# Patient Record
Sex: FEMALE | Race: WHITE | HISPANIC OR LATINO | URBAN - METROPOLITAN AREA
[De-identification: names, ages, dates, MRNs, and addresses within clinical notes are randomized per-mention and may not be internally consistent; named-entity substitution may affect disease eponyms.]

---

## 2018-06-21 ENCOUNTER — OFFICE VISIT (OUTPATIENT)
Dept: URBAN - METROPOLITAN AREA CLINIC 11 | Facility: CLINIC | Age: 38
End: 2018-06-21
Payer: COMMERCIAL

## 2018-06-21 DIAGNOSIS — H02.422 MYOGENIC PTOSIS OF LEFT EYELID: Primary | Chronic | ICD-10-CM

## 2018-06-21 PROCEDURE — 92012 INTRM OPH EXAM EST PATIENT: CPT | Performed by: OPHTHALMOLOGY

## 2018-06-21 ASSESSMENT — INTRAOCULAR PRESSURE
OD: 11
OS: 8

## 2018-06-21 NOTE — IMPRESSION/PLAN
Impression: Myogenic ptosis of left eyelid: H02.422. OS. Condition: established, stable. Symptoms: will continue to monitor. Vision: vision not affected. Plan: Discussed diagnosis in detail with patient. Discussed treatment options with patient. Eyelid surgery not indicated, she has decreased vision OS and ptosis is secondary to chronic ocular inflammation. Need to see Avery Rodriguez for cornea evaluation as well as a full dilated exam to check the retina. Will continue to observe condition and or symptoms.

## 2020-01-16 ENCOUNTER — OFFICE VISIT (OUTPATIENT)
Dept: URBAN - METROPOLITAN AREA CLINIC 11 | Facility: CLINIC | Age: 40
End: 2020-01-16
Payer: COMMERCIAL

## 2020-01-16 DIAGNOSIS — H17.89 OTHER CORNEAL SCARS AND OPACITIES: ICD-10-CM

## 2020-01-16 DIAGNOSIS — E11.9 TYPE 2 DIABETES MELLITUS W/O COMPLICATION: Primary | ICD-10-CM

## 2020-01-16 PROCEDURE — 2022F DILAT RTA XM EVC RTNOPTHY: CPT | Performed by: OPTOMETRIST

## 2020-01-16 PROCEDURE — 92014 COMPRE OPH EXAM EST PT 1/>: CPT | Performed by: OPTOMETRIST

## 2020-01-16 RX ORDER — LOTEPREDNOL ETABONATE 3.8 MG/G
0.38 % GEL OPHTHALMIC
Qty: 1 | Refills: 1 | Status: ACTIVE
Start: 2020-01-16

## 2020-01-16 ASSESSMENT — INTRAOCULAR PRESSURE
OD: 11
OS: 7

## 2020-01-16 NOTE — IMPRESSION/PLAN
Impression: Type 2 diabetes mellitus w/o complication: Y97.9. Plan: No signs of retinopathy or neovascularization noted. Discussed ocular and systemic benefits of blood sugar control.  RTC 1yr complete exam

## 2020-01-16 NOTE — IMPRESSION/PLAN
Impression: Other corneal scars and opacities: H17.89. OS Plan: Sample/Rx Lotemax SM bid OS.  Refer to Dr Anshu Christian for eval.